# Patient Record
Sex: FEMALE | Race: BLACK OR AFRICAN AMERICAN | NOT HISPANIC OR LATINO | Employment: FULL TIME | URBAN - METROPOLITAN AREA
[De-identification: names, ages, dates, MRNs, and addresses within clinical notes are randomized per-mention and may not be internally consistent; named-entity substitution may affect disease eponyms.]

---

## 2018-09-01 ENCOUNTER — HOSPITAL ENCOUNTER (EMERGENCY)
Facility: HOSPITAL | Age: 38
Discharge: HOME | End: 2018-09-01
Attending: EMERGENCY MEDICINE

## 2018-09-01 VITALS
TEMPERATURE: 99 F | HEART RATE: 88 BPM | DIASTOLIC BLOOD PRESSURE: 63 MMHG | RESPIRATION RATE: 18 BRPM | BODY MASS INDEX: 35.87 KG/M2 | HEIGHT: 61 IN | WEIGHT: 190 LBS | SYSTOLIC BLOOD PRESSURE: 112 MMHG | OXYGEN SATURATION: 95 %

## 2018-09-01 DIAGNOSIS — R59.0 LYMPHADENOPATHY, PREAURICULAR: Primary | ICD-10-CM

## 2018-09-01 LAB
B-HCG UR QL: NEGATIVE
CTP QC/QA: YES

## 2018-09-01 PROCEDURE — 81025 URINE PREGNANCY TEST: CPT | Performed by: NURSE PRACTITIONER

## 2018-09-01 PROCEDURE — 99283 EMERGENCY DEPT VISIT LOW MDM: CPT | Mod: 25

## 2018-09-01 PROCEDURE — 25000003 PHARM REV CODE 250: Performed by: PHYSICIAN ASSISTANT

## 2018-09-01 RX ORDER — IBUPROFEN 400 MG/1
400 TABLET ORAL
Status: COMPLETED | OUTPATIENT
Start: 2018-09-01 | End: 2018-09-01

## 2018-09-01 RX ADMIN — IBUPROFEN 400 MG: 400 TABLET ORAL at 09:09

## 2018-09-02 NOTE — DISCHARGE INSTRUCTIONS
You can take Tylenol or Ibuprofen to help with the pain and swelling.    Make sure you are drinking     Please schedule a follow-up appointment with primary care.    Return to the ER for any concerns.

## 2018-09-02 NOTE — ED PROVIDER NOTES
Encounter Date: 2018  SORT MSE:  Pt is a 38 y.o. female who presents for emergent consideration for swelling under left ear. Pt will be moved to room when one is available, otherwise will wait in waiting room with triage nurse supervision.  Pt arrived by ambulatory. She is not in distress. Orders have been placed. LYNN Serrano DNP ACNP-BC 2018 8:54 PM     SCRIBE #1 NOTE: IAnny am scribing for, and in the presence of,  Sonya Welch PA-C. I have scribed the following portions of the note - Other sections scribed: HPI, ROS.       History     Chief Complaint   Patient presents with    Lymphadenopathy     Woke up today with swelling under L ear. Denies sore throat and ear pain.     CC: Lymphadenopathy    HPI: This is a 38 y.o. F who has no pertinent past medical history who presents to the ED for emergent evaluation of acute swelling beneath the left ear that she noticed upon waking this morning. Pt states that swelling has reduced since onset. She notes that she has mild pain to the affected area only with touching. She also reports fatigue and generalized weakness. No OTC treatment attempted PTA. Immunizations are up to date. No ill contact at home. No recent travel out of the country. Pt denies fever, nasal congestion, sore throat, ear pain, sinus pressure, neck pain, difficulty urinating, nausea, or vomiting.      The history is provided by the patient. No  was used.     Review of patient's allergies indicates:  No Known Allergies  No past medical history on file.  Past Surgical History:   Procedure Laterality Date     SECTION       Family History   Problem Relation Age of Onset    Hypertension Mother      Social History     Tobacco Use    Smoking status: Never Smoker   Substance Use Topics    Alcohol use: No    Drug use: No     Review of Systems   Constitutional: Positive for fatigue. Negative for chills and fever.   HENT: Negative for congestion, ear pain,  sinus pressure, sinus pain and sore throat.         (+) Painful swelling beneath the left ear   Eyes: Negative for pain.   Respiratory: Negative for cough and shortness of breath.    Cardiovascular: Negative for chest pain.   Gastrointestinal: Negative for abdominal pain, diarrhea, nausea and vomiting.   Genitourinary: Negative for difficulty urinating, dysuria and vaginal bleeding.   Musculoskeletal: Negative for back pain and neck pain.   Skin: Negative for rash.   Neurological: Positive for weakness (generalized). Negative for headaches.   Psychiatric/Behavioral: Negative for confusion.       Physical Exam     Initial Vitals [09/01/18 2056]   BP Pulse Resp Temp SpO2   123/60 92 18 99.7 °F (37.6 °C) 100 %      MAP       --         Physical Exam    Nursing note and vitals reviewed.  Constitutional: Vital signs are normal. She appears well-developed and well-nourished. She is not diaphoretic. She is cooperative.  Non-toxic appearance. She does not have a sickly appearance. She does not appear ill. No distress.   HENT:   Head: Normocephalic and atraumatic.   Right Ear: Tympanic membrane, external ear and ear canal normal. No mastoid tenderness.   Left Ear: Tympanic membrane, external ear and ear canal normal. No mastoid tenderness.   Nose: Nose normal.   Mouth/Throat: Uvula is midline, oropharynx is clear and moist and mucous membranes are normal. No oral lesions. No trismus in the jaw. No dental abscesses or uvula swelling. No oropharyngeal exudate, posterior oropharyngeal edema or posterior oropharyngeal erythema.   Eyes: Conjunctivae, EOM and lids are normal. Pupils are equal, round, and reactive to light.   Neck: Trachea normal, normal range of motion, full passive range of motion without pain and phonation normal. Neck supple. Normal range of motion present. No neck rigidity.   Cardiovascular: Normal rate, regular rhythm, normal heart sounds and intact distal pulses. Exam reveals no gallop and no friction rub.     No murmur heard.  Pulmonary/Chest: Effort normal and breath sounds normal. No respiratory distress. She has no decreased breath sounds. She has no wheezes. She has no rhonchi. She has no rales.   Abdominal: Soft. Normal appearance and bowel sounds are normal. She exhibits no distension and no mass. There is no tenderness.   Musculoskeletal: Normal range of motion.   Lymphadenopathy:        Head (right side): No submental, no submandibular, no tonsillar, no preauricular, no posterior auricular and no occipital adenopathy present.        Head (left side): Preauricular adenopathy present. No submental, no submandibular, no tonsillar, no posterior auricular and no occipital adenopathy present.     She has no cervical adenopathy.   Neurological: She is alert and oriented to person, place, and time. She has normal strength.   Skin: Skin is warm and dry. Capillary refill takes less than 2 seconds. No rash noted.   Psychiatric: She has a normal mood and affect. Her speech is normal and behavior is normal. Judgment and thought content normal. Cognition and memory are normal.         ED Course   Procedures  Labs Reviewed   POCT URINE PREGNANCY          Imaging Results    None                APC / Resident Notes:   This is an evaluation of a 38 y.o. female that presents to the Emergency Department for acute swelling of left preauricular region. She reports tenderness to the region; denies trauma. She denies further symptoms.     Physical Exam shows a non-toxic, afebrile, and well appearing female. No rhinorrhea. No sinus TTP. There is mild left preauricular lymphadenopathy that is tender to palpation. No erythema. No edema. No abscess. Full ROM of the mandible. No oral lesions. No evidence of acute respiratory compromise. Posterior oropharynx is clear. TMs clear bilaterally. Neck is supple. Full ROM of the neck.     Vital Signs Are Reassuring. If available, previous records reviewed.   RESULTS: UPT negative    My overall  impression is lymphadenopathy.   DDx: lymphadenopathy, viral etiology, bacterial etiology, other    ED Course: patient stable for discharge. I will recommend Tylenol or Motrin prn. The diagnosis, treatment plan, instructions for follow-up and reevaluation with PCP, as well as ED return precautions were discussed and understanding was verbalized. All questions or concerns have been addressed. Patient was discharged home with an instructional sheet which gave not only information regarding the most likely diagnoses but also information regarding when to return to the emergency department for alarming symptoms and when to seek further care.      This case was discussed with and the patient has been examined by Dr. Brown who is in agreement with my assessment and plan.     Sonya Welch PA-C         Scribe Attestation:   Scribe #1: I performed the above scribed service and the documentation accurately describes the services I performed. I attest to the accuracy of the note.    Attending Attestation:           Physician Attestation for Scribe:  Physician Attestation Statement for Scribe #1: I, Sonya Welch PA-C, reviewed documentation, as scribed by Anny Diana in my presence, and it is both accurate and complete.                    Clinical Impression:   The encounter diagnosis was Lymphadenopathy, preauricular.      Disposition:   Disposition: Discharged  Condition: Stable                        Sonya Welch PA-C  09/01/18 [id number]

## 2021-01-20 ENCOUNTER — LAB VISIT (OUTPATIENT)
Dept: PRIMARY CARE CLINIC | Facility: OTHER | Age: 41
End: 2021-01-20
Attending: INTERNAL MEDICINE
Payer: COMMERCIAL

## 2021-01-20 DIAGNOSIS — R19.7 DIARRHEA: ICD-10-CM

## 2021-01-20 PROCEDURE — U0003 INFECTIOUS AGENT DETECTION BY NUCLEIC ACID (DNA OR RNA); SEVERE ACUTE RESPIRATORY SYNDROME CORONAVIRUS 2 (SARS-COV-2) (CORONAVIRUS DISEASE [COVID-19]), AMPLIFIED PROBE TECHNIQUE, MAKING USE OF HIGH THROUGHPUT TECHNOLOGIES AS DESCRIBED BY CMS-2020-01-R: HCPCS

## 2021-01-21 LAB — SARS-COV-2 RNA RESP QL NAA+PROBE: NOT DETECTED

## 2021-04-16 ENCOUNTER — PATIENT MESSAGE (OUTPATIENT)
Dept: RESEARCH | Facility: HOSPITAL | Age: 41
End: 2021-04-16

## 2021-09-01 ENCOUNTER — HOSPITAL ENCOUNTER (OUTPATIENT)
Facility: HOSPITAL | Age: 41
Discharge: HOME | End: 2021-09-01
Attending: OBSTETRICS & GYNECOLOGY | Admitting: OBSTETRICS & GYNECOLOGY
Payer: COMMERCIAL

## 2021-09-01 VITALS
HEIGHT: 61 IN | OXYGEN SATURATION: 99 % | HEART RATE: 94 BPM | SYSTOLIC BLOOD PRESSURE: 110 MMHG | DIASTOLIC BLOOD PRESSURE: 59 MMHG | RESPIRATION RATE: 18 BRPM | TEMPERATURE: 98 F | WEIGHT: 211 LBS | BODY MASS INDEX: 39.84 KG/M2

## 2021-09-01 DIAGNOSIS — R10.9 ABDOMINAL PAIN: ICD-10-CM

## 2021-09-01 PROCEDURE — 59025 FETAL NON-STRESS TEST: CPT

## 2021-09-01 PROCEDURE — G0378 HOSPITAL OBSERVATION PER HR: HCPCS

## 2021-09-01 PROCEDURE — 99211 OFF/OP EST MAY X REQ PHY/QHP: CPT

## 2023-06-09 ENCOUNTER — TELEPHONE (OUTPATIENT)
Dept: FAMILY MEDICINE | Facility: CLINIC | Age: 43
End: 2023-06-09
Payer: COMMERCIAL

## 2023-06-09 NOTE — TELEPHONE ENCOUNTER
----- Message from Ethel Meier sent at 6/9/2023  2:19 PM CDT -----  Regarding: needs return call  [facility]  Call back : [phone]  Reference # :[id number]    Seeing if office received prescription change request from them please call to confirm